# Patient Record
Sex: MALE | Race: WHITE | NOT HISPANIC OR LATINO
[De-identification: names, ages, dates, MRNs, and addresses within clinical notes are randomized per-mention and may not be internally consistent; named-entity substitution may affect disease eponyms.]

---

## 2017-10-23 ENCOUNTER — TRANSCRIPTION ENCOUNTER (OUTPATIENT)
Age: 32
End: 2017-10-23

## 2017-10-23 PROBLEM — Z00.00 ENCOUNTER FOR PREVENTIVE HEALTH EXAMINATION: Status: ACTIVE | Noted: 2017-10-23

## 2017-11-07 ENCOUNTER — FORM ENCOUNTER (OUTPATIENT)
Age: 32
End: 2017-11-07

## 2017-11-08 ENCOUNTER — OUTPATIENT (OUTPATIENT)
Dept: OUTPATIENT SERVICES | Facility: HOSPITAL | Age: 32
LOS: 1 days | End: 2017-11-08
Payer: COMMERCIAL

## 2017-11-08 ENCOUNTER — APPOINTMENT (OUTPATIENT)
Dept: ORTHOPEDIC SURGERY | Facility: CLINIC | Age: 32
End: 2017-11-08
Payer: COMMERCIAL

## 2017-11-08 VITALS
DIASTOLIC BLOOD PRESSURE: 80 MMHG | BODY MASS INDEX: 27.3 KG/M2 | SYSTOLIC BLOOD PRESSURE: 120 MMHG | WEIGHT: 195 LBS | HEIGHT: 71 IN

## 2017-11-08 DIAGNOSIS — M76.62 ACHILLES TENDINITIS, LEFT LEG: ICD-10-CM

## 2017-11-08 DIAGNOSIS — Z78.9 OTHER SPECIFIED HEALTH STATUS: ICD-10-CM

## 2017-11-08 PROCEDURE — 73610 X-RAY EXAM OF ANKLE: CPT | Mod: 26,LT

## 2017-11-08 PROCEDURE — 73610 X-RAY EXAM OF ANKLE: CPT

## 2017-11-08 PROCEDURE — 99203 OFFICE O/P NEW LOW 30 MIN: CPT

## 2018-01-03 ENCOUNTER — APPOINTMENT (OUTPATIENT)
Dept: ORTHOPEDIC SURGERY | Facility: CLINIC | Age: 33
End: 2018-01-03

## 2018-07-02 ENCOUNTER — TRANSCRIPTION ENCOUNTER (OUTPATIENT)
Age: 33
End: 2018-07-02

## 2018-07-27 PROBLEM — Z78.9 ALCOHOL USE: Status: ACTIVE | Noted: 2017-11-08

## 2020-02-05 ENCOUNTER — APPOINTMENT (OUTPATIENT)
Dept: ORTHOPEDIC SURGERY | Facility: CLINIC | Age: 35
End: 2020-02-05
Payer: COMMERCIAL

## 2020-02-05 VITALS
DIASTOLIC BLOOD PRESSURE: 88 MMHG | HEART RATE: 65 BPM | BODY MASS INDEX: 27.02 KG/M2 | HEIGHT: 71 IN | OXYGEN SATURATION: 98 % | SYSTOLIC BLOOD PRESSURE: 144 MMHG | WEIGHT: 193 LBS

## 2020-02-05 DIAGNOSIS — M62.89 OTHER SPECIFIED DISORDERS OF MUSCLE: ICD-10-CM

## 2020-02-05 DIAGNOSIS — M67.88 OTHER SPECIFIED DISORDERS OF SYNOVIUM AND TENDON, OTHER SITE: ICD-10-CM

## 2020-02-05 DIAGNOSIS — Z78.9 OTHER SPECIFIED HEALTH STATUS: ICD-10-CM

## 2020-02-05 DIAGNOSIS — M25.562 PAIN IN LEFT KNEE: ICD-10-CM

## 2020-02-05 PROCEDURE — 73564 X-RAY EXAM KNEE 4 OR MORE: CPT | Mod: LT

## 2020-02-05 PROCEDURE — 99214 OFFICE O/P EST MOD 30 MIN: CPT

## 2020-02-05 NOTE — HISTORY OF PRESENT ILLNESS
[de-identified] : Jalen is a 35-year-old who comes in complaining of bilateral Lower leg pain and tightness.\par He's had this issue going on for about 3 years now. He states that when he runs consistently and builds mileage he feels frequent pain and tightness in the calves and Achilles. Occasionally he gets a sharp pain in the Achilles and is afraid that it might rupture. He's had to jump off a treadmill when he gets this pain. Other times he just gets twinges more in the LEFT and RIGHT Achilles. He did see another doctor here about 2-1/2 years ago and went to physical therapy which was helpful but then stopped. If he is not running but is on his feet walking for very long day he would make it some soreness and tightness. He does lift a lot of weights and can't do a lot of squats or dead lifts.\par He also complains of some anterior LEFT knee pain that occurs sometimes with running. Sometimes therapy swelling or a bump in the anterior knee. Occasionally he will take some Aleve.\par Pain is about 2 out of can't and is cramping or shooting. It's better with rest, heat, ice and muscle relaxant as. It's worse running and weightlifting.

## 2020-02-05 NOTE — PHYSICAL EXAM
[LE] : Sensory: Intact in bilateral lower extremities [DP] : dorsalis pedis 2+ and symmetric bilaterally [Normal LLE] : Left Lower Extremity: No scars, rashes, lesions, ulcers, skin intact [PT] : posterior tibial 2+ and symmetric bilaterally [Normal RLE] : Right Lower Extremity: No scars, rashes, lesions, ulcers, skin intact [Normal Touch] : sensation intact for touch [Normal] : Oriented to person, place, and time, insight and judgement were intact and the affect was normal [Obese] : not obese [de-identified] : Knees:\par Nonantalgic gait.\par Well-formed arch with mild hyperpronation.\par No effusion.\par - erythema, edema, warmth.No prominence of the tibial tubercle or otherwise.\par No tenderness patellar facets, joint lines, patella tendon, tibial tubercle or elsewhere around the knees.\par ROM: 0 degrees extension to 135 degrees flexion. No pain or crepitus\par - Jose.\par 1A Lachman.  - Pivot shift. - posterior drawer. Normal rotational, varus/valgus laxity.\par Intact extensor mechanism.\par NVI distally.\par Lumbar range of motion is with forward flexion almost to his ankles.\par Straight leg raises to about 80°. I be tight hamstrings.\par \par Bilateral lower legs and  Foot and Ankle: \par Gait is not antalgic.\par The patient is able to walk on heels and toes without pain or difficulty\par No Edema, ecchymoses, erythema.\par Ankle range of motion is with 5- 10 degrees dorsiflexion and 35-40 degrees plantar flexion.\par Subtalar motion Intact without pain\par Hallux MP joint range of motion Within normal limits and without pain with ROM.\par Nontender Achilles tendon calves.\par No thickening of the Achilles tendon or any palpable abnormalities.\par Normal Mccormick test.\par No Deformity.\par Motor: 5/5 ATT, GS , EHL, PTT/inversion, peroneals/eversion.\par Normal neurovascular exam\par  [de-identified] : no respiratory distress [de-identified] : \par X-rays LEFT knee weightbearing 4 views today show Normal patella alignment. No evidence of any arthritis or Bone lesions

## 2020-02-05 NOTE — ASSESSMENT
[FreeTextEntry1] : 35-year-old with intermittent tightness and pain in his calves and backs of his legs associated with running. He also has intermittent anterior LEFT knee pain that was not present at this visit.\par He may have some typical runner's knee/patellofemoral pain versus patellar tendinitis.if he gets recurrent pain or swelling around the knee I would like to evaluate it further particularly when it's occurring. I will have him do physical therapy for the knee but also for generalized stretching and conditioning.\par Perhaps they can check his running on a treadmill for form to see if there is anything that might be corrected to help with his discomfort. Perhaps he is trying to run too much on his forefoot causing more tightness and pain in the calf.\par He is cross-training and not push the running to the point of any pain. If he does feel acute pain in the Achilles I would recommend stopping and no pushing through because he potentially could be at risk of an Achilles rupture. He shows warm up well and gradually and stop for any pain.\par More supportive running sneakers would be helpful. He had shoes today that really didn't have any significant support. He should go to a good running store where he can try on Sneakers that provide more support.\par Followup in 2 months.